# Patient Record
(demographics unavailable — no encounter records)

---

## 2025-01-16 NOTE — PROCEDURE
[FreeTextEntry1] : Patient evaluated history reviewed Patient given prescription for bilateral ultrasounds to rule out neuromas in the interspaces Patient given prescription for gabapentin patient will begin taking 1 pill at night due to patient's complaint of drowsiness Discussed possible neuroma treatment patient is aware of injection therapy versus removal Patient will follow-up post ultrasound

## 2025-01-16 NOTE — HISTORY OF PRESENT ILLNESS
[FreeTextEntry1] : 52-year-old female presents with bilateral foot pain patient states she recently went to a neurologist who did an EMG study patient was told she has slight neuropathy  Patient was concerned that she may have developed plantar fasciitis would like evaluation for plantar fasciitis   Patient is diabetic denies nausea vomiting fevers chills or shortness of breath  Patient was given prescription for gabapentin patient stopped taking gabapentin and return pills

## 2025-01-16 NOTE — PHYSICAL EXAM
[General Appearance - Alert] : alert [General Appearance - In No Acute Distress] : in no acute distress [Ankle Swelling (On Exam)] : not present [Varicose Veins Of Lower Extremities] : not present [Delayed in the Right Toes] : capillary refills normal in right toes [Delayed in the Left Toes] : capillary refills normal in the left toes [2+] : left foot dorsalis pedis 2+ [No Joint Swelling] : no joint swelling [Normal Foot/Ankle] : Both lower extremities were exposed and visualized. Standing exam demonstrates normal foot posture and alignment. Hindfoot exam shows no hindfoot valgus or varus [Skin Color & Pigmentation] : normal skin color and pigmentation [Skin Turgor] : normal skin turgor [] : no rash [Skin Lesions] : no skin lesions [Foot Ulcer] : no foot ulcer [Skin Induration] : no skin induration [Sensation] : the sensory exam was normal to light touch and pinprick [No Focal Deficits] : no focal deficits [Deep Tendon Reflexes (DTR)] : deep tendon reflexes were 2+ and symmetric [Motor Exam] : the motor exam was normal [Vibration Dec.] : normal vibratory sensation at the level of the toes [Position Sense Dec.] : normal position sense at the level of the toes [Diminished Throughout Right Foot] : normal sensation with monofilament testing throughout right foot [Diminished Throughout Left Foot] : normal sensation with monofilament testing throughout left foot [Oriented To Time, Place, And Person] : oriented to person, place, and time [Impaired Insight] : insight and judgment were intact [Affect] : the affect was normal